# Patient Record
(demographics unavailable — no encounter records)

---

## 2024-12-12 NOTE — PHYSICAL EXAM
[de-identified] : CI  AU [Normal] : mucosa is normal [Midline] : trachea located in midline position [de-identified] : large, obstructing OP

## 2024-12-12 NOTE — HISTORY OF PRESENT ILLNESS
[de-identified] : 56 yr old male c/o clog AS +hx CI -otalgia, tinnitus, dizzy denies Qtips -hx otitis, FH +noise exp (army) +hx concussion multiple times  +[facility] in the process of starting CPAP, still hasn't  been authorized 3/2023 hospitalized at [facility] for new dx of DM has lost 92 lbs since then